# Patient Record
Sex: FEMALE | Employment: FULL TIME | URBAN - METROPOLITAN AREA
[De-identification: names, ages, dates, MRNs, and addresses within clinical notes are randomized per-mention and may not be internally consistent; named-entity substitution may affect disease eponyms.]

---

## 2017-05-10 PROBLEM — Z98.890 HISTORY OF LOOP ELECTRICAL EXCISION PROCEDURE (LEEP): Status: ACTIVE | Noted: 2017-05-10

## 2017-05-10 PROBLEM — O09.899 CURRENT MULTIPLE PREGNANCY WITH HISTORY OF CONGENITAL HEART DISEASE IN PRIOR CHILD, ANTEPARTUM: Status: ACTIVE | Noted: 2017-05-10

## 2017-05-10 PROBLEM — O30.90 CURRENT MULTIPLE PREGNANCY WITH HISTORY OF CONGENITAL HEART DISEASE IN PRIOR CHILD, ANTEPARTUM: Status: ACTIVE | Noted: 2017-05-10

## 2017-05-10 PROBLEM — O09.299 CURRENT SINGLETON PREGNANCY WITH HISTORY OF CONGENITAL HEART DISEASE IN PRIOR CHILD, ANTEPARTUM: Status: ACTIVE | Noted: 2017-05-10

## 2017-05-10 PROBLEM — O09.899 RUBELLA NON-IMMUNE STATUS, ANTEPARTUM: Status: ACTIVE | Noted: 2017-05-10

## 2017-05-10 PROBLEM — Z28.39 RUBELLA NON-IMMUNE STATUS, ANTEPARTUM: Status: ACTIVE | Noted: 2017-05-10

## 2017-05-10 PROBLEM — O30.90 CURRENT MULTIPLE PREGNANCY WITH HISTORY OF CONGENITAL HEART DISEASE IN PRIOR CHILD, ANTEPARTUM: Status: RESOLVED | Noted: 2017-05-10 | Resolved: 2017-05-10

## 2017-05-10 PROBLEM — O09.899 CURRENT MULTIPLE PREGNANCY WITH HISTORY OF CONGENITAL HEART DISEASE IN PRIOR CHILD, ANTEPARTUM: Status: RESOLVED | Noted: 2017-05-10 | Resolved: 2017-05-10

## 2017-05-10 PROBLEM — Z34.90 PREGNANCY: Status: ACTIVE | Noted: 2017-05-10

## 2017-07-25 NOTE — PROGRESS NOTES
Patient ID verified. Allergies, medical history, prenatal record and prior to admission medications verified. Pt instructed to be NPO after midnight. Pt instructed to call @ 0500 for the time to arrive at hospital, come to entrance C and sign in at the registration desk on the 4th floor. Patient instructed to come to hospital sooner if SROM, labor, or concerning symptoms. Patient verbalized understanding. Questions encouraged and answered. Patient's prenatal record and scheduled delivery form have been scanned into [facility] care. Results console and orders have been placed in [facility] care.

## 2017-07-26 ENCOUNTER — ANESTHESIA EVENT (OUTPATIENT)
Dept: LABOR AND DELIVERY | Age: 32
End: 2017-07-26
Payer: COMMERCIAL

## 2017-07-26 ENCOUNTER — ANESTHESIA (OUTPATIENT)
Dept: LABOR AND DELIVERY | Age: 32
End: 2017-07-26
Payer: COMMERCIAL

## 2017-07-26 ENCOUNTER — HOSPITAL ENCOUNTER (INPATIENT)
Age: 32
LOS: 1 days | Discharge: HOME | End: 2017-07-27
Attending: OBSTETRICS & GYNECOLOGY | Admitting: OBSTETRICS & GYNECOLOGY
Payer: COMMERCIAL

## 2017-07-26 DIAGNOSIS — Z37.9 NORMAL LABOR: Primary | ICD-10-CM

## 2017-07-26 PROBLEM — Z3A.40 40 WEEKS GESTATION OF PREGNANCY: Status: ACTIVE | Noted: 2017-07-26

## 2017-07-26 LAB
ABO + RH BLD: NORMAL
BASE DEFICIT BLDCOA-SCNC: 1.3 MMOL/L (ref 0–2)
BASE DEFICIT BLDCOV-SCNC: 1.4 MMOL/L (ref 1.9–7.7)
BDY SITE: ABNORMAL
BDY SITE: ABNORMAL
BLOOD GROUP ANTIBODIES SERPL: NORMAL
ERYTHROCYTE [DISTWIDTH] IN BLOOD BY AUTOMATED COUNT: 13 % (ref 11.9–14.6)
HCO3 BLDCOA-SCNC: 27 MMOL/L (ref 22–26)
HCO3 BLDV-SCNC: 24 MMOL/L
HCT VFR BLD AUTO: 33.7 % (ref 35.8–46.3)
HGB BLD-MCNC: 11.4 G/DL (ref 11.7–15.4)
MCH RBC QN AUTO: 30.8 PG (ref 26.1–32.9)
MCHC RBC AUTO-ENTMCNC: 33.8 G/DL (ref 31.4–35)
MCV RBC AUTO: 91.1 FL (ref 79.6–97.8)
PCO2 BLDCOA: 61 MMHG (ref 33–49)
PCO2 BLDCOV: 40 MMHG (ref 14.1–43.3)
PH BLDCOA: 7.27 [PH] (ref 7.21–7.31)
PH BLDCOV: 7.38 [PH] (ref 7.2–7.44)
PLATELET # BLD AUTO: 285 K/UL (ref 150–450)
PMV BLD AUTO: 11.2 FL (ref 10.8–14.1)
PO2 BLDCOA: 21 MMHG (ref 9–19)
PO2 BLDV: 32 MMHG (ref 30.4–57.2)
RBC # BLD AUTO: 3.7 M/UL (ref 4.05–5.25)
SERVICE CMNT-IMP: ABNORMAL
SPECIMEN EXP DATE BLD: NORMAL
WBC # BLD AUTO: 10.5 K/UL (ref 4.3–11.1)

## 2017-07-26 PROCEDURE — 75410000000 HC DELIVERY VAGINAL/SINGLE

## 2017-07-26 PROCEDURE — 75410000003 HC RECOV DEL/VAG/CSECN EA 0.5 HR

## 2017-07-26 PROCEDURE — 75410000002 HC LABOR FEE PER 1 HR

## 2017-07-26 PROCEDURE — 74011000250 HC RX REV CODE- 250

## 2017-07-26 PROCEDURE — 65270000029 HC RM PRIVATE

## 2017-07-26 PROCEDURE — 74011250637 HC RX REV CODE- 250/637: Performed by: OBSTETRICS & GYNECOLOGY

## 2017-07-26 PROCEDURE — 82803 BLOOD GASES ANY COMBINATION: CPT

## 2017-07-26 PROCEDURE — 77030014125 HC TY EPDRL BBMI -B: Performed by: ANESTHESIOLOGY

## 2017-07-26 PROCEDURE — 74011258636 HC RX REV CODE- 258/636: Performed by: OBSTETRICS & GYNECOLOGY

## 2017-07-26 PROCEDURE — 4A1HXCZ MONITORING OF PRODUCTS OF CONCEPTION, CARDIAC RATE, EXTERNAL APPROACH: ICD-10-PCS | Performed by: OBSTETRICS & GYNECOLOGY

## 2017-07-26 PROCEDURE — 76060000078 HC EPIDURAL ANESTHESIA

## 2017-07-26 PROCEDURE — 77030002888 HC SUT CHRMC J&J -A

## 2017-07-26 PROCEDURE — 10907ZC DRAINAGE OF AMNIOTIC FLUID, THERAPEUTIC FROM PRODUCTS OF CONCEPTION, VIA NATURAL OR ARTIFICIAL OPENING: ICD-10-PCS | Performed by: OBSTETRICS & GYNECOLOGY

## 2017-07-26 PROCEDURE — A4300 CATH IMPL VASC ACCESS PORTAL: HCPCS | Performed by: ANESTHESIOLOGY

## 2017-07-26 PROCEDURE — 74011250636 HC RX REV CODE- 250/636

## 2017-07-26 PROCEDURE — 3E033VJ INTRODUCTION OF OTHER HORMONE INTO PERIPHERAL VEIN, PERCUTANEOUS APPROACH: ICD-10-PCS | Performed by: OBSTETRICS & GYNECOLOGY

## 2017-07-26 PROCEDURE — 74011250636 HC RX REV CODE- 250/636: Performed by: OBSTETRICS & GYNECOLOGY

## 2017-07-26 PROCEDURE — 85027 COMPLETE CBC AUTOMATED: CPT | Performed by: OBSTETRICS & GYNECOLOGY

## 2017-07-26 PROCEDURE — 86900 BLOOD TYPING SEROLOGIC ABO: CPT | Performed by: OBSTETRICS & GYNECOLOGY

## 2017-07-26 PROCEDURE — 77030011943

## 2017-07-26 RX ORDER — SODIUM CHLORIDE, SODIUM LACTATE, POTASSIUM CHLORIDE, CALCIUM CHLORIDE 600; 310; 30; 20 MG/100ML; MG/100ML; MG/100ML; MG/100ML
INJECTION, SOLUTION INTRAVENOUS
Status: DISCONTINUED | OUTPATIENT
Start: 2017-07-26 | End: 2017-07-26 | Stop reason: HOSPADM

## 2017-07-26 RX ORDER — IBUPROFEN 400 MG/1
400 TABLET ORAL
Status: DISCONTINUED | OUTPATIENT
Start: 2017-07-26 | End: 2017-07-28 | Stop reason: HOSPADM

## 2017-07-26 RX ORDER — LIDOCAINE HYDROCHLORIDE AND EPINEPHRINE 15; 5 MG/ML; UG/ML
INJECTION, SOLUTION EPIDURAL AS NEEDED
Status: DISCONTINUED | OUTPATIENT
Start: 2017-07-26 | End: 2017-07-26 | Stop reason: HOSPADM

## 2017-07-26 RX ORDER — ROPIVACAINE HYDROCHLORIDE 2 MG/ML
INJECTION, SOLUTION EPIDURAL; INFILTRATION; PERINEURAL
Status: DISCONTINUED | OUTPATIENT
Start: 2017-07-26 | End: 2017-07-26 | Stop reason: HOSPADM

## 2017-07-26 RX ORDER — LIDOCAINE HYDROCHLORIDE 10 MG/ML
1 INJECTION INFILTRATION; PERINEURAL
Status: DISCONTINUED | OUTPATIENT
Start: 2017-07-26 | End: 2017-07-27 | Stop reason: HOSPADM

## 2017-07-26 RX ORDER — ROPIVACAINE HYDROCHLORIDE 2 MG/ML
INJECTION, SOLUTION EPIDURAL; INFILTRATION; PERINEURAL AS NEEDED
Status: DISCONTINUED | OUTPATIENT
Start: 2017-07-26 | End: 2017-07-26 | Stop reason: HOSPADM

## 2017-07-26 RX ORDER — BUTORPHANOL TARTRATE 1 MG/ML
1 INJECTION INTRAMUSCULAR; INTRAVENOUS
Status: DISCONTINUED | OUTPATIENT
Start: 2017-07-26 | End: 2017-07-27 | Stop reason: HOSPADM

## 2017-07-26 RX ORDER — OXYTOCIN/RINGER'S LACTATE 30/500 ML
.5-2 PLASTIC BAG, INJECTION (ML) INTRAVENOUS
Status: DISCONTINUED | OUTPATIENT
Start: 2017-07-26 | End: 2017-07-27 | Stop reason: HOSPADM

## 2017-07-26 RX ORDER — DEXTROSE, SODIUM CHLORIDE, SODIUM LACTATE, POTASSIUM CHLORIDE, AND CALCIUM CHLORIDE 5; .6; .31; .03; .02 G/100ML; G/100ML; G/100ML; G/100ML; G/100ML
125 INJECTION, SOLUTION INTRAVENOUS CONTINUOUS
Status: DISCONTINUED | OUTPATIENT
Start: 2017-07-26 | End: 2017-07-27

## 2017-07-26 RX ORDER — OXYTOCIN/RINGER'S LACTATE 15/250 ML
250 PLASTIC BAG, INJECTION (ML) INTRAVENOUS ONCE
Status: ACTIVE | OUTPATIENT
Start: 2017-07-26 | End: 2017-07-26

## 2017-07-26 RX ORDER — MINERAL OIL
120 OIL (ML) ORAL
Status: DISCONTINUED | OUTPATIENT
Start: 2017-07-26 | End: 2017-07-27 | Stop reason: HOSPADM

## 2017-07-26 RX ORDER — SODIUM CHLORIDE 0.9 % (FLUSH) 0.9 %
5-10 SYRINGE (ML) INJECTION EVERY 8 HOURS
Status: DISCONTINUED | OUTPATIENT
Start: 2017-07-26 | End: 2017-07-27

## 2017-07-26 RX ORDER — HYDROCODONE BITARTRATE AND ACETAMINOPHEN 5; 325 MG/1; MG/1
1 TABLET ORAL
Status: DISCONTINUED | OUTPATIENT
Start: 2017-07-26 | End: 2017-07-28 | Stop reason: HOSPADM

## 2017-07-26 RX ORDER — SODIUM CHLORIDE 0.9 % (FLUSH) 0.9 %
5-10 SYRINGE (ML) INJECTION AS NEEDED
Status: DISCONTINUED | OUTPATIENT
Start: 2017-07-26 | End: 2017-07-27

## 2017-07-26 RX ORDER — LIDOCAINE HYDROCHLORIDE 20 MG/ML
JELLY TOPICAL
Status: DISCONTINUED | OUTPATIENT
Start: 2017-07-26 | End: 2017-07-27 | Stop reason: HOSPADM

## 2017-07-26 RX ADMIN — ROPIVACAINE HYDROCHLORIDE 10 ML/HR: 2 INJECTION, SOLUTION EPIDURAL; INFILTRATION; PERINEURAL at 15:53

## 2017-07-26 RX ADMIN — LIDOCAINE HYDROCHLORIDE AND EPINEPHRINE 5 ML: 15; 5 INJECTION, SOLUTION EPIDURAL at 15:45

## 2017-07-26 RX ADMIN — IBUPROFEN 400 MG: 400 TABLET, FILM COATED ORAL at 22:30

## 2017-07-26 RX ADMIN — SODIUM CHLORIDE, SODIUM LACTATE, POTASSIUM CHLORIDE, CALCIUM CHLORIDE, AND DEXTROSE MONOHYDRATE 125 ML/HR: 600; 310; 30; 20; 5 INJECTION, SOLUTION INTRAVENOUS at 08:30

## 2017-07-26 RX ADMIN — SODIUM CHLORIDE, SODIUM LACTATE, POTASSIUM CHLORIDE, CALCIUM CHLORIDE: 600; 310; 30; 20 INJECTION, SOLUTION INTRAVENOUS at 15:32

## 2017-07-26 RX ADMIN — SODIUM CHLORIDE, SODIUM LACTATE, POTASSIUM CHLORIDE, CALCIUM CHLORIDE, AND DEXTROSE MONOHYDRATE 125 ML/HR: 600; 310; 30; 20; 5 INJECTION, SOLUTION INTRAVENOUS at 16:19

## 2017-07-26 RX ADMIN — ROPIVACAINE HYDROCHLORIDE 5 ML: 2 INJECTION, SOLUTION EPIDURAL; INFILTRATION; PERINEURAL at 15:49

## 2017-07-26 RX ADMIN — OXYTOCIN 2 MILLI-UNITS/MIN: 10 INJECTION, SOLUTION INTRAMUSCULAR; INTRAVENOUS at 09:34

## 2017-07-26 NOTE — PROGRESS NOTES
07/26/17 0947   Cervical Exam   Dilation (cm) 3   Eff 60 %   Station -2   Position Posterior   Cervical Consistency Soft   Vaginal exam done by?  JEWELL RN   FHR Interpretation Overall pattern reassuring   Tolerated exam well. Monitors adjusted. Pitocin infusing at 2 mu started at 0934.

## 2017-07-26 NOTE — PROGRESS NOTES
Pt admitted to 432 for IOL. Plan of care reviewed with pt. Verbalizes understanding. Consents witnessed. IV started on 2nd attempt, blood drawn and sent to lab.

## 2017-07-26 NOTE — PROGRESS NOTES
Dr. Dora Mckenzie at bedside and reviews strip. SVE performed per Dr. Dora Mckenzie pt 7/100/-1, verbal orders received to restart pitocin at 4 milliunits, orders read back and verified.

## 2017-07-26 NOTE — ANESTHESIA PROCEDURE NOTES
Epidural Block    Start time: 7/26/2017 3:38 PM  End time: 7/26/2017 3:45 PM  Performed by: Rogelio Bray  Authorized by: Afshin HENDERSON     Pre-Procedure  Indication: labor epidural    Preanesthetic Checklist: patient identified, risks and benefits discussed, anesthesia consent, site marked, patient being monitored, timeout performed and anesthesia consent    Timeout Time: 15:38        Epidural:   Patient position:  Seated  Prep region:  Lumbar  Prep: DuraPrep    Location:  L3-4    Needle and Epidural Catheter:   Needle Type:  Tuohy  Needle Gauge:  18 G  Injection Technique:  Loss of resistance using air  Attempts:  1  Catheter Size:  20 G  Depth in Epidural Space (cm):  4  Events: no blood with aspiration, no cerebrospinal fluid with aspiration, no paresthesia and negative aspiration test    Test Dose:  Lidocaine 1.5% w/ epi and negative    Assessment:   Catheter Secured:  Tegaderm and tape  Insertion:  Uncomplicated  Patient tolerance:  Patient tolerated the procedure well with no immediate complications

## 2017-07-26 NOTE — ANESTHESIA PREPROCEDURE EVALUATION
Anesthetic History               Review of Systems / Medical History  Patient summary reviewed    Pulmonary                   Neuro/Psych              Cardiovascular                  Exercise tolerance: >4 METS     GI/Hepatic/Renal                Endo/Other             Other Findings              Physical Exam    Airway  Mallampati: II  TM Distance: > 6 cm  Neck ROM: normal range of motion   Mouth opening: Normal     Cardiovascular  Regular rate and rhythm,  S1 and S2 normal,  no murmur, click, rub, or gallop             Dental  No notable dental hx       Pulmonary  Breath sounds clear to auscultation               Abdominal         Other Findings            Anesthetic Plan    ASA: 2  Anesthesia type: epidural            Anesthetic plan and risks discussed with: Patient

## 2017-07-26 NOTE — PROGRESS NOTES
Patient Vitals for the past 4 hrs:    Mode Fetal Heart Rate Variability Decelerations Accelerations FHR Interventions   07/26/17 1800 External 130 6-25 BPM Early;Variable No -   07/26/17 1746 External 130 6-25 BPM Early;Variable No -   07/26/17 1730 External 125 6-25 BPM Early;Variable Yes -   07/26/17 1716 External 125 6-25 BPM Early;Variable No -   07/26/17 1701 External 130 6-25 BPM Early;Variable Yes -   07/26/17 1642 External 130 6-25 BPM Early;Variable Yes -   07/26/17 1631 External 130 6-25 BPM Early;Variable No -   07/26/17 1617 External 135 6-25 BPM Early;Variable Yes -   07/26/17 1600 External 125 6-25 BPM Variable Yes -   07/26/17 1546 External 130 6-25 BPM Early;Variable Yes -   07/26/17 1531 External 135 6-25 BPM Early;Variable Yes -   07/26/17 1515 External 130 6-25 BPM (!) Late Yes IV Fluid Bolus;Vaginal Exam   07/26/17 1459 External 135 6-25 BPM (!) Early;Late;Variable No -   07/26/17 1445 External 135 6-25 BPM (!) Early;Late;Variable No -   07/26/17 1430 External 135 6-25 BPM (!) Late;Variable Yes -   6/C/-1  TOCO- 2-3

## 2017-07-26 NOTE — PROGRESS NOTES
Patient Vitals for the past 4 hrs: Mode Fetal Heart Rate Variability Decelerations Accelerations FHR Interventions   07/26/17 1904 - - - - - IV Fluid Bolus; Oxygen;Provider Notified   07/26/17 1900 External 125 6-25 BPM (!) Late No Decrease Oxytocin; Discontinue Oxytocin   07/26/17 1845 External 130 6-25 BPM (!) Early;Late;Variable No Lateral Left;Vaginal Exam   07/26/17 1831 External 135 6-25 BPM Early;Variable No -   07/26/17 1815 External 125 6-25 BPM Early;Variable No -   07/26/17 1800 External 130 6-25 BPM Early;Variable No -   07/26/17 1746 External 130 6-25 BPM Early;Variable No -   07/26/17 1730 External 125 6-25 BPM Early;Variable Yes -   07/26/17 1716 External 125 6-25 BPM Early;Variable No -   07/26/17 1701 External 130 6-25 BPM Early;Variable Yes -   07/26/17 1642 External 130 6-25 BPM Early;Variable Yes -   07/26/17 1631 External 130 6-25 BPM Early;Variable No -   07/26/17 1617 External 135 6-25 BPM Early;Variable Yes -   07/26/17 1600 External 125 6-25 BPM Variable Yes -   07/26/17 1546 External 130 6-25 BPM Early;Variable Yes -   cx exam 7/c/-1  TOCO- 6 pitocin off secondary previous variables  Restart pitocin

## 2017-07-26 NOTE — PROGRESS NOTES
Pt turned to right side; occasional late decels, moderate variability. Portion of strip shown to Dr. Yesenia Galdamez. FHR recovers well with moderate variability and accels.

## 2017-07-26 NOTE — IP AVS SNAPSHOT
[address] [facility] [address] 
[phone] Patient: Jennifer Segura MRN: [id number] :1985 You are allergic to the following Allergen Reactions Pcn (Penicillins) Unknown (comments) Immunizations Administered for This Admission Name Date MMR 2017 Tdap 2017 Recent Documentation Breastfeeding? OB Status Smoking Status Yes Recent pregnancy Never Smoker Emergency Contacts Name Discharge Info Relation Home Work Mobile Eduardo Cobos  Spouse [3] [phone] About your hospitalization You were admitted on:  2017 You last received care in the:  [facility] You were discharged on:  2017 Unit phone number:  [phone] Why you were hospitalized Your primary diagnosis was:  40 Weeks Gestation Of Pregnancy Your diagnoses also included:  Normal Labor Providers Seen During Your Hospitalizations Provider Role Specialty Primary office phone Jarad Dixon MD Attending Provider Obstetrics & Gynecology [phone] Your Primary Care Physician (PCP) Primary Care Physician Office Phone Office Fax NONE ** None ** ** None ** Follow-up Information Follow up With Details Comments Contact Info None   None ([phone]) Patient stated that they have no PCP Judith Andrews DO In 6 weeks  [address] [place] [address] [facility] [place] [id number] [phone] Your Appointments 2017 10:45 AM EDT PostPartum 2 week with Judith Andrews DO  
[address] [place] [address] [phone] Current Discharge Medication List  
  
START taking these medications Dose & Instructions Dispensing Information Comments Morning Noon Evening Bedtime HYDROcodone-acetaminophen 5-325 mg per tablet Commonly known as:  [place] Your last dose was: Your next dose is:    
   
   
 Dose:  1 Tab Take 1 Tab by mouth every four (4) hours as needed. Max Daily Amount: 6 Tabs. Quantity:  20 Tab Refills:  0  
     
   
   
   
  
 ibuprofen 800 mg tablet Commonly known as:  MOTRIN Your last dose was: Your next dose is:    
   
   
 Dose:  800 mg Take 1 Tab by mouth every eight (8) hours as needed. Quantity:  60 Tab Refills:  0 CONTINUE these medications which have NOT CHANGED Dose & Instructions Dispensing Information Comments Morning Noon Evening Bedtime PRENATAL + DHA PO Your last dose was: Your next dose is: Take  by mouth. Refills:  0 Where to Get Your Medications Information on where to get these meds will be given to you by the nurse or doctor. ! Ask your nurse or doctor about these medications HYDROcodone-acetaminophen 5-325 mg per tablet  
 ibuprofen 800 mg tablet Discharge Instructions After Your Delivery (the Postpartum Period): Care Instructions Your Care Instructions Congratulations on the birth of your baby. Like pregnancy, the  period can be a time of excitement, bello, and exhaustion. You may look at your wondrous little baby and feel happy. You may also be overwhelmed by your new sleep hours and new responsibilities. At first, babies often sleep during the days and are awake at night. They do not have a pattern or routine. They may make sudden gasps, jerk themselves awake, or look like they have crossed eyes. These are all normal, and they may even make you smile. In these first weeks after delivery, try to take good care of yourself.  It may take 4 to 6 weeks to feel like yourself again, and possibly longer if you had a  birth. You will likely feel very tired for several weeks. Your days will be full of ups and downs, but lots of bello as well. Follow-up care is a key part of your treatment and safety. Be sure to make and go to all appointments, and call your doctor if you are having problems. It's also a good idea to know your test results and keep a list of the medicines you take. How can you care for yourself at home? Take care of your body after delivery · Use pads instead of tampons for the bloody flow that may last as long as 2 weeks. · Ease cramps with ibuprofen (Advil, Motrin). · Ease soreness of hemorrhoids and the area between your vagina and rectum with ice compresses or witch hazel pads. · Ease constipation by drinking lots of fluid and eating high-fiber foods. Ask your doctor about over-the-counter stool softeners. · Cleanse yourself with a gentle squeeze of warm water from a bottle instead of wiping with toilet paper. · Take a sitz bath in warm water several times a day. · Wear a good nursing bra. Ease sore and swollen breasts with warm, wet washcloths. · If you are not breastfeeding, use ice rather than heat for breast soreness. · Your period may not start for several months if you are breastfeeding. You may bleed more, and longer at first, than you did before you got pregnant. · Wait until you are healed (about 4 to 6 weeks) before you have sexual intercourse. Your doctor will tell you when it is okay to have sex. · Try not to travel with your baby for 5 or 6 weeks. If you take a long car trip, make frequent stops to walk around and stretch. Avoid exhaustion · Rest every day. Try to nap when your baby naps. · Ask another adult to be with you for a few days after delivery. · Plan for  if you have other children. · Stay flexible so you can eat at odd hours and sleep when you need to. Both you and your baby are making new schedules. · Plan small trips to get out of the house. Change can make you feel less tired. · Ask for help with housework, cooking, and shopping. Remind yourself that your job is to care for your baby. Know about help for postpartum depression · \"Baby blues\" are common for the first 1 to 2 weeks after birth. You may cry or feel sad or irritable for no reason. · Rest whenever you can. Being tired makes it harder to handle your emotions. · Go for walks with your baby. · Talk to your partner, friends, and family about your feelings. · If your symptoms last for more than a few weeks, or if you feel very depressed, ask your doctor for help. · Postpartum depression can be treated. Support groups and counseling can help. Sometimes medicine can also help. Stay healthy · Eat healthy foods so you have more energy, make good breast milk, and lose extra baby pounds. · If you breastfeed, avoid alcohol and drugs. Stay smoke-free. If you quit during pregnancy, congratulations. · Start daily exercise after 4 to 6 weeks, but rest when you feel tired. · Learn exercises to tone your belly. Do Kegel exercises to regain strength in your pelvic muscles. You can do these exercises while you stand or sit. ¨ Squeeze the same muscles you would use to stop your urine. Your belly and thighs should not move. ¨ Hold the squeeze for 3 seconds, and then relax for 3 seconds. ¨ Start with 3 seconds. Then add 1 second each week until you are able to squeeze for 10 seconds. ¨ Repeat the exercise 10 to 15 times for each session. Do three or more sessions each day. · Find a class for new mothers and new babies that has an exercise time. · If you had a  birth, give yourself a bit more time before you exercise, and be careful. When should you call for help? Call 911 anytime you think you may need emergency care. For example, call if: 
· You passed out (lost consciousness). Call your doctor now or seek immediate medical care if: · You have severe vaginal bleeding. This means you are passing blood clots and soaking through a pad each hour for 2 or more hours. · You are dizzy or lightheaded, or you feel like you may faint. · You have a fever. · You have new belly pain, or your pain gets worse. Watch closely for changes in your health, and be sure to contact your doctor if: 
· Your vaginal bleeding seems to be getting heavier. · You have new or worse vaginal discharge. · You feel sad, anxious, or hopeless for more than a few days. · You do not get better as expected. Where can you learn more? Go to http://maxx-whitley.info/. Enter A461 in the search box to learn more about \"After Your Delivery (the Postpartum Period): Care Instructions. \" Current as of: March 16, 2017 Content Version: 11.3 © 7097-7364 0-6.com. Care instructions adapted under license by [employer] (which disclaims liability or warranty for this information). If you have questions about a medical condition or this instruction, always ask your healthcare professional. [address] any warranty or liability for your use of this information. Discharge Orders None [employer] Announcement We are excited to announce that we are making your provider's discharge notes available to you in [employer]. You will see these notes when they are completed and signed by the physician that discharged you from your recent hospital stay. If you have any questions or concerns about any information you see in [employer], please call the Health Information Department where you were seen or reach out to your Primary Care Provider for more information about your plan of care. Introducing [facility]! Ashtyn Aviles introduces [employer] patient portal. Now you can access parts of your medical record, email your doctor's office, and request medication refills online.    
 
1. In your internet browser, go to [email or web address] 2. Click on the First Time User? Click Here link in the Sign In box. You will see the New Member Sign Up page. 3. Enter your [employer] Access Code exactly as it appears below. You will not need to use this code after youve completed the sign-up process. If you do not sign up before the expiration date, you must request a new code. · [employer] Access Code: [email or web address] Expires: 8/6/2017 11:10 AM 
 
4. Enter the last four digits of your Social Security Number (xxxx) and Date of Birth (mm/dd/yyyy) as indicated and click Submit. You will be taken to the next sign-up page. 5. Create a [employer] ID. This will be your [employer] login ID and cannot be changed, so think of one that is secure and easy to remember. 6. Create a [employer] password. You can change your password at any time. 7. Enter your Password Reset Question and Answer. This can be used at a later time if you forget your password. 8. Enter your e-mail address. You will receive e-mail notification when new information is available in [address] 9. Click Sign Up. You can now view and download portions of your medical record. 10. Click the Download Summary menu link to download a portable copy of your medical information. If you have questions, please visit the Frequently Asked Questions section of the [employer] website. Remember, [employer] is NOT to be used for urgent needs. For medical emergencies, dial 911. Now available from your iPhone and Android! General Information Please provide this summary of care documentation to your next provider. Patient Signature:  ____________________________________________________________ Date:  ____________________________________________________________  
  
Sigmund [place] Provider Signature:  ____________________________________________________________ Date:  ____________________________________________________________

## 2017-07-26 NOTE — IP AVS SNAPSHOT
[place] [address] 
[phone] Patient: Daniel Saleh MRN: [id number] :1985 Current Discharge Medication List  
  
START taking these medications Dose & Instructions Dispensing Information Comments Morning Noon Evening Bedtime HYDROcodone-acetaminophen 5-325 mg per tablet Commonly known as:  Selvin Lala Your last dose was: Your next dose is:    
   
   
 Dose:  1 Tab Take 1 Tab by mouth every four (4) hours as needed. Max Daily Amount: 6 Tabs. Quantity:  20 Tab Refills:  0  
     
   
   
   
  
 ibuprofen 800 mg tablet Commonly known as:  MOTRIN Your last dose was: Your next dose is:    
   
   
 Dose:  800 mg Take 1 Tab by mouth every eight (8) hours as needed. Quantity:  60 Tab Refills:  0 CONTINUE these medications which have NOT CHANGED Dose & Instructions Dispensing Information Comments Morning Noon Evening Bedtime PRENATAL + DHA PO Your last dose was: Your next dose is: Take  by mouth. Refills:  0 Where to Get Your Medications Information on where to get these meds will be given to you by the nurse or doctor. ! Ask your nurse or doctor about these medications HYDROcodone-acetaminophen 5-325 mg per tablet  
 ibuprofen 800 mg tablet

## 2017-07-26 NOTE — PROGRESS NOTES
Dr. Pisano Folds at bedside. Portion of strip reviewed by MD. PAULINO per MD 3/50  1349:  AROM; clear fluid, pt may have epidural when having painful, strong contractions.

## 2017-07-26 NOTE — PROGRESS NOTES
Dr. Joseph Pitt given update on pt and fetal status, and interventions. No new orders received at this time.

## 2017-07-26 NOTE — PROGRESS NOTES
Repetitive early with occ late decels.   Moderate variability  SVE 7/100-1  Pt repositioned to left side on peanut

## 2017-07-26 NOTE — H&P
Edwar Briones  [id number]      History and Physical  Subjective:     Patient is a 33 y.o.  female presents with induction. See office notes on prenatal care. Lab Results   Component Value Date/Time    ABO/Rh(D) A POSITIVE 07/26/2017 08:30 AM    Antibody screen, External negative 01/06/2017    Antibody screen NEG 07/26/2017 08:30 AM    Rubella, External non-immune 01/06/2017    GrBStrep, External negative 06/28/2017    HBsAg, External negative 01/06/2017    HIV, External NR 01/06/2017    RPR, External negative 01/19/2017    Gonorrhea, External negative 01/19/2017    Chlamydia, External negative 01/19/2017    ABO,Rh A positive 01/06/2017               Patient Active Problem List    Diagnosis Date Noted    40 weeks gestation of pregnancy 07/26/2017     Priority: 1 - One    Normal labor 07/26/2017    Pregnancy 05/10/2017    History of loop electrical excision procedure (LEEP) 05/10/2017    Rubella non-immune status, antepartum 05/10/2017    Current anderson pregnancy with history of congenital heart disease in prior child, antepartum 05/10/2017     Past Medical History:   Diagnosis Date    Abnormal Papanicolaou smear of cervix       Past Surgical History:   Procedure Laterality Date    HX DILATION AND CURETTAGE      HX LEEP PROCEDURE        Prior to Admission Medications   Prescriptions Last Dose Informant Patient Reported? Taking? PRENATAL VIT #91/FE FUM/FA/DHA (PRENATAL + DHA PO) 7/25/2017 at Unknown time  Yes Yes   Sig: Take  by mouth.       Facility-Administered Medications: None     Allergies   Allergen Reactions    Pcn [Penicillins] Unknown (comments)      Social History   Substance Use Topics    Smoking status: Never Smoker    Smokeless tobacco: Never Used    Alcohol use No      Family History   Problem Relation Age of Onset    Hypertension Mother     Diabetes Father     Heart Disease Father           Objective:     Patient Vitals for the past 8 hrs:   BP Temp Pulse Resp 17 1346 111/68 - 69 -   17 1330 107/58 - 63 -   17 1315 104/58 - 69 -   17 1301 100/55 - 68 18   17 1246 101/56 - 71 -   17 1221 105/66 - 68 -   17 1205 111/62 - 71 -   17 1201 - - - 18   17 1149 110/64 - 71 18   17 1134 113/66 - 78 -   17 1119 106/57 - 69 20   17 1105 109/59 - 70 -   17 1050 108/61 - 69 -   17 1035 107/60 - 66 -   17 1019 111/55 - 79 -   17 1004 116/64 - 77 -   17 0948 119/61 - 73 20   17 0934 111/55 - 72 -   17 0824 115/63 98.3 °F (36.8 °C) 86 18        Fetal Monitorin's moderate variability with accels no decels   Uterine Activity: Frequency: Every 2 minutes   Dilation: 3 cm   Effacement: 50%   Station: -3  AROM- clear    Assessment:     Principal Problem:    40 weeks gestation of pregnancy (2017)    Active Problems:    Normal labor (2017)        Plan:     Reassuring fetal status, Labor  Progressing normally  Continue expectant management

## 2017-07-26 NOTE — PROGRESS NOTES
Patient Vitals for the past 4 hrs:    Mode Fetal Heart Rate Variability Decelerations Accelerations FHR Interventions   07/26/17 1701 External 130 6-25 BPM Early;Variable Yes -   07/26/17 1642 External 130 6-25 BPM Early;Variable Yes -   07/26/17 1631 External 130 6-25 BPM Early;Variable No -   07/26/17 1617 External 135 6-25 BPM Early;Variable Yes -   07/26/17 1600 External 125 6-25 BPM Variable Yes -   07/26/17 1546 External 130 6-25 BPM Early;Variable Yes -   07/26/17 1531 External 135 6-25 BPM Early;Variable Yes -   07/26/17 1515 External 130 6-25 BPM (!) Late Yes IV Fluid Bolus;Vaginal Exam   07/26/17 1459 External 135 6-25 BPM (!) Early;Late;Variable No -   07/26/17 1445 External 135 6-25 BPM (!) Early;Late;Variable No -   07/26/17 1430 External 135 6-25 BPM (!) Late;Variable Yes -   07/26/17 1415 External 135 6-25 BPM (!) Late No Lateral Right   07/26/17 1400 External 135 6-25 BPM Variable No Lateral Left   07/26/17 1346 External 135 6-25 BPM Variable Yes -   07/26/17 1330 External 130 6-25 BPM (!) Late;Variable Yes -   Pt comfortable doing well with epidural  Per RN 5 cm

## 2017-07-26 NOTE — PROGRESS NOTES
Dr. Giovanna Banda in to see pt. Portion of strip reviewed. SVE per MD 6 cm/-1  Repositioned onto peanut, right side.

## 2017-07-27 VITALS
OXYGEN SATURATION: 99 % | HEART RATE: 96 BPM | TEMPERATURE: 98.9 F | RESPIRATION RATE: 18 BRPM | DIASTOLIC BLOOD PRESSURE: 57 MMHG | SYSTOLIC BLOOD PRESSURE: 120 MMHG

## 2017-07-27 PROCEDURE — 77010026065 HC OXYGEN MINIMUM MEDICAL AIR

## 2017-07-27 PROCEDURE — 90715 TDAP VACCINE 7 YRS/> IM: CPT | Performed by: OBSTETRICS & GYNECOLOGY

## 2017-07-27 PROCEDURE — 90707 MMR VACCINE SC: CPT | Performed by: OBSTETRICS & GYNECOLOGY

## 2017-07-27 PROCEDURE — 74011250637 HC RX REV CODE- 250/637: Performed by: OBSTETRICS & GYNECOLOGY

## 2017-07-27 PROCEDURE — 74011250636 HC RX REV CODE- 250/636: Performed by: OBSTETRICS & GYNECOLOGY

## 2017-07-27 RX ORDER — NALOXONE HYDROCHLORIDE 0.4 MG/ML
0.4 INJECTION, SOLUTION INTRAMUSCULAR; INTRAVENOUS; SUBCUTANEOUS AS NEEDED
Status: DISCONTINUED | OUTPATIENT
Start: 2017-07-27 | End: 2017-07-28 | Stop reason: HOSPADM

## 2017-07-27 RX ORDER — OXYCODONE AND ACETAMINOPHEN 10; 325 MG/1; MG/1
1 TABLET ORAL
Status: DISCONTINUED | OUTPATIENT
Start: 2017-07-27 | End: 2017-07-28 | Stop reason: HOSPADM

## 2017-07-27 RX ORDER — ZOLPIDEM TARTRATE 5 MG/1
5 TABLET ORAL
Status: DISCONTINUED | OUTPATIENT
Start: 2017-07-27 | End: 2017-07-28 | Stop reason: HOSPADM

## 2017-07-27 RX ORDER — SIMETHICONE 80 MG
80 TABLET,CHEWABLE ORAL
Status: DISCONTINUED | OUTPATIENT
Start: 2017-07-27 | End: 2017-07-28 | Stop reason: HOSPADM

## 2017-07-27 RX ORDER — IBUPROFEN 800 MG/1
800 TABLET ORAL
Qty: 60 TAB | Refills: 0 | Status: SHIPPED | OUTPATIENT
Start: 2017-07-27

## 2017-07-27 RX ORDER — HYDROCODONE BITARTRATE AND ACETAMINOPHEN 5; 325 MG/1; MG/1
1 TABLET ORAL
Qty: 20 TAB | Refills: 0 | Status: SHIPPED | OUTPATIENT
Start: 2017-07-27

## 2017-07-27 RX ORDER — DIPHENHYDRAMINE HCL 25 MG
25 CAPSULE ORAL
Status: DISCONTINUED | OUTPATIENT
Start: 2017-07-27 | End: 2017-07-28 | Stop reason: HOSPADM

## 2017-07-27 RX ADMIN — HYDROCODONE BITARTRATE AND ACETAMINOPHEN 1 TABLET: 5; 325 TABLET ORAL at 09:20

## 2017-07-27 RX ADMIN — HYDROCODONE BITARTRATE AND ACETAMINOPHEN 1 TABLET: 5; 325 TABLET ORAL at 00:00

## 2017-07-27 RX ADMIN — TETANUS TOXOID, REDUCED DIPHTHERIA TOXOID AND ACELLULAR PERTUSSIS VACCINE, ADSORBED 0.5 ML: 5; 2.5; 8; 8; 2.5 SUSPENSION INTRAMUSCULAR at 13:22

## 2017-07-27 RX ADMIN — IBUPROFEN 400 MG: 400 TABLET, FILM COATED ORAL at 02:54

## 2017-07-27 RX ADMIN — MEASLES, MUMPS, AND RUBELLA VIRUS VACCINE LIVE 0.5 ML: 1000; 12500; 1000 INJECTION, POWDER, LYOPHILIZED, FOR SUSPENSION SUBCUTANEOUS at 22:31

## 2017-07-27 RX ADMIN — IBUPROFEN 400 MG: 400 TABLET, FILM COATED ORAL at 08:19

## 2017-07-27 RX ADMIN — IBUPROFEN 400 MG: 400 TABLET, FILM COATED ORAL at 18:36

## 2017-07-27 RX ADMIN — IBUPROFEN 400 MG: 400 TABLET, FILM COATED ORAL at 13:51

## 2017-07-27 NOTE — LACTATION NOTE
In to see mom and infant for first time. Mom stated that infant has latched and nurses well. Infant circumcised earlier today and was asleep in mom's arms. Infant also less than 24 hours old. Reviewed with mom the expectations of first 24 hours as well as second night of life. Mom wants an early discharge this evening. Mom is confident with no concerns or questions at this time. Mom and infant are following up with [facility] and will see lactation consultant there.

## 2017-07-27 NOTE — PROGRESS NOTES
Shift assessment complete. Discussed tonight plan of care with pt and FOB. Pt denies pain or vaginal pressure at this time. Instructed pt to call with increase vaginal pressure. Pt placed in high throne per Dr. Juan Mcdonald. Will continue to leave O2 on at this time to see how baby response to restarting pitocin. Will continue to monitor. Call light within reach.

## 2017-07-27 NOTE — ANESTHESIA POSTPROCEDURE EVALUATION
Post-Anesthesia Evaluation and Assessment    Patient: Hailee Campoverde MRN: [id number]  SSN: [id number]    YOB: 1985  Age: 33 y.o. Sex: female       Cardiovascular Function/Vital Signs  Visit Vitals    /55 (BP 1 Location: Right arm, BP Patient Position: At rest)    Pulse 80    Temp 36.9 °C (98.4 °F)    Resp 18    SpO2 99%    Breastfeeding Yes       Patient is status post No value filed. anesthesia for * No procedures listed *. Nausea/Vomiting: None    Postoperative hydration reviewed and adequate. Pain:  Pain Scale 1: Numeric (0 - 10) (07/26/17 2359)  Pain Intensity 1: 1 (07/26/17 2359)   Managed    Neurological Status:   Neuro  LLE Motor Response: Numbness (07/27/17 0045)  RLE Motor Response: Pharmacologically paralyzed (07/26/17 1936)   At baseline    Mental Status and Level of Consciousness: Arousable    Pulmonary Status:   O2 Device: Room air (07/27/17 0045)   Adequate oxygenation and airway patent    Complications related to anesthesia: None    Post-anesthesia assessment completed.  No concerns    Signed By: Goldie Kincaid MD     July 27, 2017

## 2017-07-27 NOTE — PROGRESS NOTES
Head of bed lowered to perform SVE, 9/100/0. EFM adjusted to soft non tender abdomen. O2 remains on, LR bolus given. Will notify MD of SVE.

## 2017-07-27 NOTE — PROGRESS NOTES
To bathroom with assistance. Voided 400 clear urine. Yesenia care taught and understanding demonstrated. Returned to bed. No assistance needed.

## 2017-07-27 NOTE — PROGRESS NOTES
2122 Dr. Enzo Vasquez at bedside and reviews part of strip  2123 per Dr. Enzo Vasquez pt is complete and will begin pushing with next contraction  2124 pushing began with Dr. nEzo Vasquez at bedside. 2129 vaginal delivery of viable male.    2133 placenta delivered

## 2017-07-27 NOTE — PROGRESS NOTES
SBAR IN Report: Mother    Verbal report received from Miller Ramirez on this patient, who is now being transferred from labor and delivery for routine progression of care. The patient is not wearing a green \"Anesthesia-Duramorph\" band. Report consisted of patient's Situation, Background, Assessment and Recommendations (SBAR). [place] ID bands were compared with the identification form, and verified with the patient and transferring nurse. Information from the SBAR, Procedure Summary, Intake/Output, MAR and Recent Results and the Kimberlee Report was reviewed with the transferring nurse; opportunity for questions and clarification provided.

## 2017-07-27 NOTE — DISCHARGE INSTRUCTIONS
After Your Delivery (the Postpartum Period): Care Instructions  Your Care Instructions  Congratulations on the birth of your baby. Like pregnancy, the  period can be a time of excitement, bello, and exhaustion. You may look at your wondrous little baby and feel happy. You may also be overwhelmed by your new sleep hours and new responsibilities. At first, babies often sleep during the days and are awake at night. They do not have a pattern or routine. They may make sudden gasps, jerk themselves awake, or look like they have crossed eyes. These are all normal, and they may even make you smile. In these first weeks after delivery, try to take good care of yourself. It may take 4 to 6 weeks to feel like yourself again, and possibly longer if you had a  birth. You will likely feel very tired for several weeks. Your days will be full of ups and downs, but lots of bello as well. Follow-up care is a key part of your treatment and safety. Be sure to make and go to all appointments, and call your doctor if you are having problems. It's also a good idea to know your test results and keep a list of the medicines you take. How can you care for yourself at home? Take care of your body after delivery  · Use pads instead of tampons for the bloody flow that may last as long as 2 weeks. · Ease cramps with ibuprofen (Advil, Motrin). · Ease soreness of hemorrhoids and the area between your vagina and rectum with ice compresses or witch hazel pads. · Ease constipation by drinking lots of fluid and eating high-fiber foods. Ask your doctor about over-the-counter stool softeners. · Cleanse yourself with a gentle squeeze of warm water from a bottle instead of wiping with toilet paper. · Take a sitz bath in warm water several times a day. · Wear a good nursing bra. Ease sore and swollen breasts with warm, wet washcloths. · If you are not breastfeeding, use ice rather than heat for breast soreness.   · Your period may not start for several months if you are breastfeeding. You may bleed more, and longer at first, than you did before you got pregnant. · Wait until you are healed (about 4 to 6 weeks) before you have sexual intercourse. Your doctor will tell you when it is okay to have sex. · Try not to travel with your baby for 5 or 6 weeks. If you take a long car trip, make frequent stops to walk around and stretch. Avoid exhaustion  · Rest every day. Try to nap when your baby naps. · Ask another adult to be with you for a few days after delivery. · Plan for  if you have other children. · Stay flexible so you can eat at odd hours and sleep when you need to. Both you and your baby are making new schedules. · Plan small trips to get out of the house. Change can make you feel less tired. · Ask for help with housework, cooking, and shopping. Remind yourself that your job is to care for your baby. Know about help for postpartum depression  · \"Baby blues\" are common for the first 1 to 2 weeks after birth. You may cry or feel sad or irritable for no reason. · Rest whenever you can. Being tired makes it harder to handle your emotions. · Go for walks with your baby. · Talk to your partner, friends, and family about your feelings. · If your symptoms last for more than a few weeks, or if you feel very depressed, ask your doctor for help. · Postpartum depression can be treated. Support groups and counseling can help. Sometimes medicine can also help. Stay healthy  · Eat healthy foods so you have more energy, make good breast milk, and lose extra baby pounds. · If you breastfeed, avoid alcohol and drugs. Stay smoke-free. If you quit during pregnancy, congratulations. · Start daily exercise after 4 to 6 weeks, but rest when you feel tired. · Learn exercises to tone your belly. Do Kegel exercises to regain strength in your pelvic muscles. You can do these exercises while you stand or sit.   ¨ Squeeze the same muscles you would use to stop your urine. Your belly and thighs should not move. ¨ Hold the squeeze for 3 seconds, and then relax for 3 seconds. ¨ Start with 3 seconds. Then add 1 second each week until you are able to squeeze for 10 seconds. ¨ Repeat the exercise 10 to 15 times for each session. Do three or more sessions each day. · Find a class for new mothers and new babies that has an exercise time. · If you had a  birth, give yourself a bit more time before you exercise, and be careful. When should you call for help? Call 911 anytime you think you may need emergency care. For example, call if:  · You passed out (lost consciousness). Call your doctor now or seek immediate medical care if:  · You have severe vaginal bleeding. This means you are passing blood clots and soaking through a pad each hour for 2 or more hours. · You are dizzy or lightheaded, or you feel like you may faint. · You have a fever. · You have new belly pain, or your pain gets worse. Watch closely for changes in your health, and be sure to contact your doctor if:  · Your vaginal bleeding seems to be getting heavier. · You have new or worse vaginal discharge. · You feel sad, anxious, or hopeless for more than a few days. · You do not get better as expected. Where can you learn more? Go to http://maxx-whitley.info/. Enter A461 in the search box to learn more about \"After Your Delivery (the Postpartum Period): Care Instructions. \"  Current as of: 2017  Content Version: 11.3  © 4342-1965 [employer]. Care instructions adapted under license by [employer] (which disclaims liability or warranty for this information). If you have questions about a medical condition or this instruction, always ask your healthcare professional. [address] any warranty or liability for your use of this information.

## 2017-07-27 NOTE — PROGRESS NOTES
Straight cath performed using sterile technique due to the fact that pt still has numbness in LLE and is unable to ambulate at this time. Yesenia care performed in preparation of pt being transferred to MIU.

## 2017-07-27 NOTE — DISCHARGE SUMMARY
Obstetrical Discharge Summary     Name: Jesenia Espinosa MRN: [id number]  SSN: [id number]    YOB: 1985  Age: 33 y.o. Sex: female      Admit Date: 2017    Discharge Date: 2017     Admitting Physician: Sonam Sen MD     Attending Physician:  Sonam Sen MD     * Admission Diagnoses: labor;Normal labor;40 weeks gestation of pregnancy    * Discharge Diagnoses:   Information for the patient's :  Merissa Chapin, [[id number]]   Delivery of a 7 lb 11.6 oz (3.505 kg) male infant via Vaginal, Spontaneous Delivery on 2017 at 9:29 PM  by . Apgars were 9 and 9. Additional Diagnoses:   Hospital Problems as of 2017  Date Reviewed: 2017          Codes Class Noted - Resolved POA    * (Principal)40 weeks gestation of pregnancy ICD-10-CM: Z3A.40  ICD-9-CM: V22.2  2017 - Present Unknown        Normal labor ICD-10-CM: O80, Z37.9  ICD-9-CM: 284  2017 - Present Unknown             Lab Results   Component Value Date/Time    ABO/Rh(D) A POSITIVE 2017 08:30 AM    Rubella, External non-immune 2017    GrBStrep, External negative 2017    ABO,Rh A positive 2017    There is no immunization history for the selected administration types on file for this patient. * Procedures:   * No surgery found *           * Discharge Condition: good    [facility] Course: Normal hospital course following the delivery. * Disposition: Home    Discharge Medications:   Current Discharge Medication List      START taking these medications    Details   HYDROcodone-acetaminophen (NORCO) 5-325 mg per tablet Take 1 Tab by mouth every four (4) hours as needed. Max Daily Amount: 6 Tabs. Qty: 20 Tab, Refills: 0    Associated Diagnoses: Normal labor      ibuprofen (MOTRIN) 800 mg tablet Take 1 Tab by mouth every eight (8) hours as needed.   Qty: 60 Tab, Refills: 0    Associated Diagnoses: Normal labor         CONTINUE these medications which have NOT CHANGED Details   PRENATAL VIT #91/FE FUM/FA/DHA (PRENATAL + DHA PO) Take  by mouth. * Follow-up Care/Patient Instructions:   Activity: No sex for 6 weeks  Diet: Regular Diet  Wound Care: Keep wound clean and dry    Follow-up Information     Follow up With Details Comments Contact Info    None   None ([phone]) Patient stated that they have no PCP             Signed By:  Cory Nance DO     July 27, 2017

## 2017-07-27 NOTE — LACTATION NOTE
Mom and baby are going home today. Continue to offer the breast without restriction. Mom's milk should be fully in over the next few days. Reviewed engorgement precautions. Hand Expression has been demoed and written hand-out reviewed. As milk comes in baby will be more alert at the breast and swallows will be more obvious. Breasts may feel softer once baby has finished nursing. Baby should be back to birth weight by 3weeks of age. And then gain on average 1 oz per day for the next 2-3 months. Reviewed babies should be exclusively breastfeeding for the first 6 months and that breastfeeding should continue after introduction of appropriate complimentary foods after 6 months. Initial output should be at least 1 wet and 1 bowel movement for each day old baby is. By day 5-7 once milk is fully in baby will consistently have 6 or more soaking wet diapers and about 4 bowel movement. Some babies have a bowel movement with every feeding and some have 1-3 large bowel movements each day. Inadequate output may indicate inadequate feedings and should be reported to your Pediatrician. Bowel habits may change as baby gets older. Encouraged follow-up at Pediatrician in 1-2 days, no later than 1 week of age. Call [facility] for any questions as needed or to set up an OP visit. OP phone calls are returned within 24 hours. Breastfeeding Support Group is offered here monthly. Community Breastfeeding Resource List given.

## 2017-07-27 NOTE — PROGRESS NOTES
SBAR OUT Report: Mother    Verbal report given to Onofre Birch RN (full name & credentials) on this patient, who is now being transferred to MIU (unit) for routine progression of care. The patient is not wearing a green \"Anesthesia-Duramorph\" band. Report consisted of patient's Situation, Background, Assessment and Recommendations (SBAR).  ID bands were compared with the identification form, and verified with the patient and receiving nurse. Information from the SBAR, Procedure Summary, Intake/Output, MAR and Recent Results and the Kimberlee Report was reviewed with the receiving nurse; opportunity for questions and clarification provided.

## 2017-07-27 NOTE — PROGRESS NOTES
Call placed to Dr. Christian Weaver and informed her of pt SVE 9/100/0 and that pitocin is at 6 milliunits. No new orders received. MD to make her way to floor.

## 2017-07-28 NOTE — PROGRESS NOTES
Discharge instructions and immunizations given to patient with follow up and medication instructions. Opportunity for questions, patient verbalized understanding. Patient taken out for discharge with infant in arms.

## 2021-01-10 NOTE — L&D DELIVERY NOTE
Delivery Summary    Patient: Sabra Siddiqi MRN: [id number]  SSN: [id number]    YOB: 1985  Age: 33 y.o. Sex: female        Labor Events:    Labor: No    Rupture Date: 2017    Rupture Time: 1:49 PM    Rupture Type AROM    Amniotic Fluid Volume:      Amniotic Fluid Description: Clear  None    Induction: AROM; Oxytocin        Augmentation: Oxytocin;AROM    Labor Complications: None     Additional Complications:        Cervical Ripening:       None      Delivery Events:  Episiotomy: None    Laceration(s): None      Repaired: None     Number of Repair Packets:      Suture Type and Size:         Estimated Blood Loss (ml): 300        Information for the patient's :  Dayan Patrick [[id number]]     Delivery Summary - Baby    Delivery Date: 2017   Delivery Time: 9:29 PM   Delivery Type: Vaginal, Spontaneous Delivery  Sex:  male  Gestational Age: 37w0d  Delivery Clinician:  Bing Whitley  Living?: Living   Delivery Location: L&D             APGARS  One minute Five minutes Ten minutes   Skin Color: 1    1       Heart Rate: 2   2         Reflex Irritability: 2   2         Muscle Tone: 2   2       Respiration: 2   2         Total: 9   9           Presentation: Vertex  Position: Left Occiput Anterior  Resuscitation Method:  Suctioning-bulb; Tactile Stimulation     Meconium Stained:      Cord Information: 3 Vessels   Complications: None  Cord Blood Sent?:  Yes    Blood Gases Sent?:  Yes    Placenta:  Date/Time:   9:33 PM  Removal: Spontaneous      Appearance: Normal      Measurements:  Birth Weight:      Birth Length:     Head Circumference:       Chest Circumference:      Abdominal Girth:       Other Providers:   KRYSTLE Castillo;LIUDMILA BLOCK;MARISELA ABAD;MELISSA GARNER Obstetrician;Primary Nurse;Staff Nurse;Scrub Tech           Cord Blood Results:  Information for the patient's :  Dayan Patrick, [[id number]]   No results found for: Norman Amato, PCTDIG, BILI, ABORHEXT, [address] Nathan    Information for the patient's :  Dada Boss, [[id number]]   No results found for: APH, APCO2, APO2, AHCO3, ABEC, ABDC, O2ST, SITE, RSCOM, PHI, Carrier, PO2I, HCO3I, SO2I, IBD     Information for the patient's :  Dada Boss, [[id number]]   No results found for: EPHV, PCO2V, PO2V, HCO3V, O2STV, EBDV   over intact perineum without difficulty, JACKIE, cord noted, delivered thru cord. No shoulder dystocia. Delayed cord, infant directly to Moms chest. Cord later clamped and cut after stopped pulsating. Spontaneous delivery of placenta. Excellent hemostasis and cosmesis.  R-V exam normal. Name band;

## 2022-02-11 NOTE — PROGRESS NOTES
Post-Partum Day Number 1 Progress/Discharge Note    Patient doing well post-partum without significant complaint. Voiding without difficulty, normal lochia, positive flatus. PT desires discharge    Vitals:  Patient Vitals for the past 8 hrs:   BP Temp Pulse Resp   17 0806 111/64 98.2 °F (36.8 °C) 70 18     Temp (24hrs), Av.1 °F (36.7 °C), Min:97.7 °F (36.5 °C), Max:98.4 °F (36.9 °C)      Vital signs stable, afebrile. Exam:  Patient without distress. Abdomen soft, fundus firm at level of umbilicus, non tender               Perineum with normal lochia noted. Lower extremities are negative for swelling, cords or tenderness. Lab/Data Review:  CBC: No results found for: WBC, HGB, HGBEXT, HCT, HCTEXT, PLT, PLTEXT, HGBEXT, HCTEXT, PLTEXT    Assessment and Plan:  Patient appears to be having uncomplicated post-partum course. Continue routine perineal care and maternal education. Plan discharge for today with follow up in our office in 6 weeks. Reason for Call: Request for an order or referral:    Order or referral being requested: dexcom    Date needed: as soon as possible    Has the patient been seen by the PCP for this problem? YES    Additional comments: pt was screaming and yelling, she wants this fixed now-said it has already been ordered and doesn't know what the hold up is.    Phone number Patient can be reached at:  Home number on file [phone] (home)    Best Time:  anytime    Can we leave a detailed message on this number?  YES    Call taken on 2/11/2022 at 1:40 PM by Dheeraj Mullins

## 2022-03-18 PROBLEM — Z3A.40 40 WEEKS GESTATION OF PREGNANCY: Status: ACTIVE | Noted: 2017-07-26

## 2022-03-19 PROBLEM — O09.299 CURRENT SINGLETON PREGNANCY WITH HISTORY OF CONGENITAL HEART DISEASE IN PRIOR CHILD, ANTEPARTUM: Status: ACTIVE | Noted: 2017-05-10

## 2022-03-19 PROBLEM — Z28.39 RUBELLA NON-IMMUNE STATUS, ANTEPARTUM: Status: ACTIVE | Noted: 2017-05-10

## 2022-03-19 PROBLEM — O09.899 RUBELLA NON-IMMUNE STATUS, ANTEPARTUM: Status: ACTIVE | Noted: 2017-05-10

## 2022-03-19 PROBLEM — Z37.9 NORMAL LABOR: Status: ACTIVE | Noted: 2017-07-26

## 2022-03-20 PROBLEM — Z34.90 PREGNANCY: Status: ACTIVE | Noted: 2017-05-10

## 2022-03-20 PROBLEM — Z98.890 HISTORY OF LOOP ELECTRICAL EXCISION PROCEDURE (LEEP): Status: ACTIVE | Noted: 2017-05-10

## 2022-12-12 NOTE — PROGRESS NOTES
HPI:  Ms. Maria C Baires is a 40 y.o.   OB History    No obstetric history on file. who is here today for a well woman exam. She complains of ***     Date Performed Result   PAP     Mammogram     Colonoscopy     Dexa           No obstetric history on file. GYN History           No LMP recorded. Cycle Length {Numbers; 0-100:89750} Lasting {Numbers; 0-10:38971}  {Pos/neg trace:29878} dysmenorrhea; {Pos/neg trace:40995} postcoital bleeding    Past Medical History:  No past medical history on file. Past Surgical History:  No past surgical history on file. Allergies:   Not on File    Medication History:  No current outpatient medications on file. No current facility-administered medications for this visit. Social History:  Social History     Socioeconomic History    Marital status:      Spouse name: Not on file    Number of children: Not on file    Years of education: Not on file    Highest education level: Not on file   Occupational History    Not on file   Tobacco Use    Smoking status: Not on file    Smokeless tobacco: Not on file   Substance and Sexual Activity    Alcohol use: Not on file    Drug use: Not on file    Sexual activity: Not on file   Other Topics Concern    Not on file   Social History Narrative    Not on file     Social Determinants of Health     Financial Resource Strain: Not on file   Food Insecurity: Not on file   Transportation Needs: Not on file   Physical Activity: Not on file   Stress: Not on file   Social Connections: Not on file   Intimate Partner Violence: Not on file   Housing Stability: Not on file       Family History:  No family history on file. Review of Systems - General ROS: negative except for that discussed in HPI      ROS:  Feeling well. No dyspnea or chest pain on exertion. No abdominal pain, change in bowel habits, black or bloody stools. No urinary tract symptoms. No neurological complaints. Objective:    There were no vitals taken for this visit. No results found for any visits on 12/13/22. The patient appears well, alert, oriented x 3, in no distress. ENT normal.  Neck supple. No adenopathy or thyromegaly. Lungs:  clear, good air entry, no wheezes, rhonchi or rales. Heart:  S1 and S2 normal, no murmurs, regular rate and rhythm. Abdomen:  soft without tenderness, guarding, mass or organomegaly. Extremities show no edema, normal peripheral pulses. Neurological is normal, no focal findings. BREAST EXAM: {pe breast exam:825985::\"breasts appear normal, no suspicious masses, no skin or nipple changes or axillary nodes\"}    PELVIC EXAM: External genitalia is {NORMAL_ABNORMAL:12714}, urethra, urethra meatus and bladder are midline well supported. Vagina is well rugated, Cervix comes into full view and is {NORMAL_ABNORMAL:83427}.  Uterus is ***, *** week size, no ovarian masses palpated    Assessment/Plan:   {Assessment and Plan Chronic Disease:2528327427}    {gyn plan:772423::\"mammogram\",\"pap smear\",\"return annually or prn\"}    Italo Dewey MA

## 2022-12-13 ENCOUNTER — OFFICE VISIT (OUTPATIENT)
Dept: OBGYN CLINIC | Age: 37
End: 2022-12-13
Payer: COMMERCIAL

## 2022-12-13 VITALS
SYSTOLIC BLOOD PRESSURE: 114 MMHG | WEIGHT: 142.6 LBS | BODY MASS INDEX: 22.38 KG/M2 | DIASTOLIC BLOOD PRESSURE: 68 MMHG | HEIGHT: 67 IN

## 2022-12-13 DIAGNOSIS — Z11.51 SCREENING FOR HUMAN PAPILLOMAVIRUS (HPV): Primary | ICD-10-CM

## 2022-12-13 DIAGNOSIS — Z12.4 SCREENING FOR MALIGNANT NEOPLASM OF CERVIX: ICD-10-CM

## 2022-12-13 PROCEDURE — 99385 PREV VISIT NEW AGE 18-39: CPT | Performed by: NURSE PRACTITIONER

## 2022-12-13 RX ORDER — LEVONORGESTREL 52 MG/1
1 INTRAUTERINE DEVICE INTRAUTERINE ONCE
COMMUNITY

## 2022-12-13 NOTE — PROGRESS NOTES
Patient presents today for a routine gynecological examination with no complaints. Last pap: 19 - Nilm, HPV neg     OB History          4    Para   2    Term   2            AB   2    Living   2         SAB   2    IAB        Ectopic        Molar        Multiple        Live Births   2                  GYN History           No LMP recorded. Cycle Length 28 Lasting 4  negative dysmenorrhea; negative postcoital bleeding    Past Medical History:  No past medical history on file. Past Surgical History:  Past Surgical History:   Procedure Laterality Date    DILATION AND CURETTAGE OF UTERUS  2016    following a miscarriage    LEEP         Allergies: Allergies   Allergen Reactions    Penicillins      Unsure of reaction       Medication History:  Current Outpatient Medications   Medication Sig Dispense Refill    levonorgestrel (MIRENA, 52 MG,) IUD 52 mg 1 each by IntraUTERine route once Mirena IUD placed 2017 by        No current facility-administered medications for this visit.        Social History:  Social History     Socioeconomic History    Marital status:      Spouse name: Not on file    Number of children: Not on file    Years of education: Not on file    Highest education level: Not on file   Occupational History    Not on file   Tobacco Use    Smoking status: Never    Smokeless tobacco: Never   Vaping Use    Vaping Use: Never used   Substance and Sexual Activity    Alcohol use: Yes    Drug use: Never    Sexual activity: Yes     Partners: Male   Other Topics Concern    Not on file   Social History Narrative    Not on file     Social Determinants of Health     Financial Resource Strain: Not on file   Food Insecurity: Not on file   Transportation Needs: Not on file   Physical Activity: Not on file   Stress: Not on file   Social Connections: Not on file   Intimate Partner Violence: Not on file   Housing Stability: Not on file       Family History:  Family History Problem Relation Age of Onset    Heart Attack Father     Hypertension Father             Diabetes Father     Stroke Mother         10/2019    Hypertension Mother     Diabetes Mother        Review of Systems - General ROS: negative except for that discussed in HPI      ROS:  Feeling well. No dyspnea or chest pain on exertion. No abdominal pain, change in bowel habits, black or bloody stools. No urinary tract symptoms. No neurological complaints. Objective:   Ht 5' 7\" (1.702 m)   Wt 142 lb 9.6 oz (64.7 kg)   BMI 22.33 kg/m²   The patient appears well, alert, oriented x 3, in no distress. ENT normal.  Neck supple. No adenopathy or thyromegaly. Lungs:  clear, good air entry, no wheezes, rhonchi or rales. Heart:  S1 and S2 normal, no murmurs, regular rate and rhythm. Abdomen:  soft without tenderness, guarding, mass or organomegaly. Extremities show no edema, normal peripheral pulses. Neurological is normal, no focal findings. BREAST EXAM: breasts appear normal, no suspicious masses, no skin or nipple changes or axillary nodes, symmetric fibrous changes bilaterally    PELVIC EXAM: VULVA: normal appearing vulva with no masses, tenderness or lesions, VAGINA: normal appearing vagina with normal color and discharge, no lesions, CERVIX: normal appearing cervix without discharge or lesions, IUD strings visible UTERUS: uterus is normal size, shape, consistency and nontender, ADNEXA: normal adnexa in size, nontender and no masses    Assessment/Plan:     1. Screening for human papillomavirus (HPV)    - PAP IG, HPV Rfx HPV 16/18,45; Future  - PAP IG, HPV Rfx HPV 16/18,45    2. Screening for malignant neoplasm of cervix    - PAP IG, HPV Rfx HPV 16/18,45; Future  - PAP IG, HPV Rfx HPV 16/18,45      pap smear  Has mirena IUD which she states is working well for her  return annually or prn    Supervising physician is Dr. Linn Gallagher.     Arabella Thrasher, APRN - CNP

## 2022-12-18 LAB
CYTOLOGIST CVX/VAG CYTO: NORMAL
CYTOLOGY CVX/VAG DOC THIN PREP: NORMAL
HPV APTIMA: NEGATIVE
Lab: NORMAL
PATH REPORT.FINAL DX SPEC: NORMAL
STAT OF ADQ CVX/VAG CYTO-IMP: NORMAL

## 2025-08-12 ENCOUNTER — OFFICE VISIT (OUTPATIENT)
Dept: OBGYN CLINIC | Age: 40
End: 2025-08-12
Payer: COMMERCIAL

## 2025-08-12 VITALS
BODY MASS INDEX: 24.86 KG/M2 | HEIGHT: 67 IN | SYSTOLIC BLOOD PRESSURE: 120 MMHG | DIASTOLIC BLOOD PRESSURE: 72 MMHG | WEIGHT: 158.4 LBS

## 2025-08-12 DIAGNOSIS — Z30.431 IUD CHECK UP: ICD-10-CM

## 2025-08-12 DIAGNOSIS — N93.8 DUB (DYSFUNCTIONAL UTERINE BLEEDING): Primary | ICD-10-CM

## 2025-08-12 PROCEDURE — 99213 OFFICE O/P EST LOW 20 MIN: CPT | Performed by: OBSTETRICS & GYNECOLOGY

## 2025-08-12 RX ORDER — MISOPROSTOL 200 UG/1
TABLET ORAL
Qty: 1 TABLET | Refills: 0 | Status: SHIPPED | OUTPATIENT
Start: 2025-08-12

## 2025-08-12 ASSESSMENT — LIFESTYLE VARIABLES: HOW OFTEN DO YOU HAVE A DRINK CONTAINING ALCOHOL: 2-4 TIMES A MONTH
